# Patient Record
Sex: FEMALE | Race: BLACK OR AFRICAN AMERICAN | NOT HISPANIC OR LATINO | Employment: OTHER | URBAN - METROPOLITAN AREA
[De-identification: names, ages, dates, MRNs, and addresses within clinical notes are randomized per-mention and may not be internally consistent; named-entity substitution may affect disease eponyms.]

---

## 2023-09-15 DIAGNOSIS — D50.0 ANEMIA DUE TO CHRONIC BLOOD LOSS: Primary | ICD-10-CM

## 2023-12-05 ENCOUNTER — OFFICE VISIT (OUTPATIENT)
Dept: HEMATOLOGY/ONCOLOGY | Facility: CLINIC | Age: 63
End: 2023-12-05
Payer: MEDICARE

## 2023-12-05 VITALS
SYSTOLIC BLOOD PRESSURE: 128 MMHG | HEART RATE: 94 BPM | HEIGHT: 64 IN | WEIGHT: 285 LBS | BODY MASS INDEX: 48.65 KG/M2 | TEMPERATURE: 98 F | RESPIRATION RATE: 18 BRPM | OXYGEN SATURATION: 99 % | DIASTOLIC BLOOD PRESSURE: 75 MMHG

## 2023-12-05 DIAGNOSIS — D50.9 IRON DEFICIENCY ANEMIA, UNSPECIFIED IRON DEFICIENCY ANEMIA TYPE: Primary | ICD-10-CM

## 2023-12-05 DIAGNOSIS — D50.0 ANEMIA DUE TO CHRONIC BLOOD LOSS: ICD-10-CM

## 2023-12-05 PROCEDURE — 99204 PR OFFICE/OUTPT VISIT, NEW, LEVL IV, 45-59 MIN: ICD-10-PCS | Mod: ,,, | Performed by: STUDENT IN AN ORGANIZED HEALTH CARE EDUCATION/TRAINING PROGRAM

## 2023-12-05 PROCEDURE — 1159F MED LIST DOCD IN RCRD: CPT | Mod: CPTII,,, | Performed by: STUDENT IN AN ORGANIZED HEALTH CARE EDUCATION/TRAINING PROGRAM

## 2023-12-05 PROCEDURE — 99204 OFFICE O/P NEW MOD 45 MIN: CPT | Mod: ,,, | Performed by: STUDENT IN AN ORGANIZED HEALTH CARE EDUCATION/TRAINING PROGRAM

## 2023-12-05 PROCEDURE — 3074F SYST BP LT 130 MM HG: CPT | Mod: CPTII,,, | Performed by: STUDENT IN AN ORGANIZED HEALTH CARE EDUCATION/TRAINING PROGRAM

## 2023-12-05 PROCEDURE — 3008F PR BODY MASS INDEX (BMI) DOCUMENTED: ICD-10-PCS | Mod: CPTII,,, | Performed by: STUDENT IN AN ORGANIZED HEALTH CARE EDUCATION/TRAINING PROGRAM

## 2023-12-05 PROCEDURE — 3074F PR MOST RECENT SYSTOLIC BLOOD PRESSURE < 130 MM HG: ICD-10-PCS | Mod: CPTII,,, | Performed by: STUDENT IN AN ORGANIZED HEALTH CARE EDUCATION/TRAINING PROGRAM

## 2023-12-05 PROCEDURE — 4010F PR ACE/ARB THEARPY RXD/TAKEN: ICD-10-PCS | Mod: CPTII,,, | Performed by: STUDENT IN AN ORGANIZED HEALTH CARE EDUCATION/TRAINING PROGRAM

## 2023-12-05 PROCEDURE — 4010F ACE/ARB THERAPY RXD/TAKEN: CPT | Mod: CPTII,,, | Performed by: STUDENT IN AN ORGANIZED HEALTH CARE EDUCATION/TRAINING PROGRAM

## 2023-12-05 PROCEDURE — 1159F PR MEDICATION LIST DOCUMENTED IN MEDICAL RECORD: ICD-10-PCS | Mod: CPTII,,, | Performed by: STUDENT IN AN ORGANIZED HEALTH CARE EDUCATION/TRAINING PROGRAM

## 2023-12-05 PROCEDURE — 3078F DIAST BP <80 MM HG: CPT | Mod: CPTII,,, | Performed by: STUDENT IN AN ORGANIZED HEALTH CARE EDUCATION/TRAINING PROGRAM

## 2023-12-05 PROCEDURE — 3078F PR MOST RECENT DIASTOLIC BLOOD PRESSURE < 80 MM HG: ICD-10-PCS | Mod: CPTII,,, | Performed by: STUDENT IN AN ORGANIZED HEALTH CARE EDUCATION/TRAINING PROGRAM

## 2023-12-05 PROCEDURE — 3008F BODY MASS INDEX DOCD: CPT | Mod: CPTII,,, | Performed by: STUDENT IN AN ORGANIZED HEALTH CARE EDUCATION/TRAINING PROGRAM

## 2023-12-05 RX ORDER — HYDROCHLOROTHIAZIDE 25 MG/1
25 TABLET ORAL DAILY
COMMUNITY

## 2023-12-05 RX ORDER — CYCLOBENZAPRINE HCL 10 MG
10 TABLET ORAL 2 TIMES DAILY PRN
COMMUNITY

## 2023-12-05 RX ORDER — FERROUS SULFATE 325(65) MG
325 TABLET, DELAYED RELEASE (ENTERIC COATED) ORAL DAILY
COMMUNITY

## 2023-12-05 RX ORDER — SIMVASTATIN 20 MG/1
20 TABLET, FILM COATED ORAL NIGHTLY
COMMUNITY

## 2023-12-05 RX ORDER — FINASTERIDE 1 MG/1
1 TABLET, FILM COATED ORAL DAILY
COMMUNITY

## 2023-12-05 RX ORDER — LOSARTAN POTASSIUM 50 MG/1
50 TABLET ORAL DAILY
COMMUNITY

## 2023-12-05 RX ORDER — VIT C/E/ZN/COPPR/LUTEIN/ZEAXAN 250MG-90MG
CAPSULE ORAL
COMMUNITY

## 2023-12-05 RX ORDER — NAPROXEN 500 MG/1
500 TABLET ORAL 2 TIMES DAILY
COMMUNITY
Start: 2023-11-25

## 2023-12-05 RX ORDER — PREGABALIN 75 MG/1
75 CAPSULE ORAL 2 TIMES DAILY
COMMUNITY

## 2023-12-05 RX ORDER — OXYCODONE AND ACETAMINOPHEN 10; 325 MG/1; MG/1
1 TABLET ORAL 3 TIMES DAILY
COMMUNITY

## 2023-12-05 NOTE — PROGRESS NOTES
Referring provider:  Brielle Smith NP    Reason for consultation:  Iron-deficiency anemia        HPI:    Patient is a 63-year-old with history of hemorrhoids, hypertension and hyperlipidemia who was found to have iron-deficiency anemia at her PCP's office.  She was treated with oral iron without improvement in his hemoglobin level which led to a referral to our clinic.  Patient presented to our clinic on 12/05/2023 to establish care.    Patient reports symptoms of bright red blood per rectum and attributes to hemorrhoids.  She intermittently has dark stools as well.  Patient reports undergoing colonoscopy 5 years back.  She had symptoms of menorrhagia with led to a hysterectomy many years back with resolution of symptoms since.    She denies any family history of colon cancer.  She lives by herself.  Disabled.  Denies smoking, alcohol or recreational drug use.      Interval history:    Today, 12/05/2023, patient reports intermittent dyspnea on exertion but denies any shortness of breath at rest.  She denies any palpitations, chest tightness, fatigue or dizziness.  She was symptoms of constipation as well as bright red blood per rectum intermittently.    Laboratory     08/30/2023 ferritin 3, iron 16, TIBC 448, % saturation 4, hemoglobin 7.4, MCV 68.8, platelet count 290, WBC count 5.8    Past Medical History:   Diagnosis Date    Anemia, unspecified     Grade II hemorrhoids     Hypertension, essential     Peripheral venous insufficiency     Undiagnosed cardiac murmurs        Past Surgical History:   Procedure Laterality Date    HYSTERECTOMY         Family History   Problem Relation Age of Onset    Diabetes Mother     Hypertension Mother     Heart disease Father        Social History     Socioeconomic History    Marital status: Single   Tobacco Use    Smoking status: Never    Smokeless tobacco: Never   Substance and Sexual Activity    Alcohol use: Never    Drug use: Never       Current Outpatient Medications    Medication Sig Dispense Refill    cholecalciferol, vitamin D3, (VITAMIN D3) 25 mcg (1,000 unit) capsule 1 tablet Orally Once a day      cyclobenzaprine (FLEXERIL) 10 MG tablet Take 10 mg by mouth 2 (two) times daily as needed.      ferrous sulfate 325 (65 FE) MG EC tablet Take 325 mg by mouth once daily.      finasteride (PROPECIA) 1 mg tablet Take 1 mg by mouth once daily.      hydroCHLOROthiazide (HYDRODIURIL) 25 MG tablet Take 25 mg by mouth once daily.      losartan (COZAAR) 50 MG tablet Take 50 mg by mouth once daily.      naproxen (NAPROSYN) 500 MG tablet Take 500 mg by mouth 2 (two) times daily.      oxyCODONE-acetaminophen (PERCOCET)  mg per tablet Take 1 tablet by mouth 3 (three) times daily.      pregabalin (LYRICA) 75 MG capsule Take 75 mg by mouth 2 (two) times daily.      simvastatin (ZOCOR) 20 MG tablet Take 20 mg by mouth every evening.       No current facility-administered medications for this visit.       Review of patient's allergies indicates:  No Known Allergies      Review of systems  CONSTITUTIONAL: no fevers, no chills, no weight loss, no fatigue, no weakness  HEMATOLOGIC: no abnormal bleeding, no abnormal bruising, no drenching night sweats  ONCOLOGIC: no new masses or lumps  HEENT: no vision loss, no tinnitus or hearing loss, no nose bleeding, no dysphagia, no odynophagia  CVS: no chest pain, no palpitations, no dyspnea on exertion  RESP: no shortness of breath, no hemoptysis, no cough  GI: no nausea, no vomiting, no diarrhea, no constipation, no melena, no hematochezia, no hematemesis, no abdominal pain, no increase in abdominal girth  : no dysuria, no hematuria, no hesitancy, no scrotal swelling, no discharge  INTEGUMENT: no rashes, no abnormal bruising, no nail pitting, no hyperpigmentation  NEURO: no falls, no memory loss, no paresthesias or dysesthesias, no urofecal incontinence or retention, no loss of strength on any extremity  MSK: no back pain, no new joint pain, no joint  swelling  PSYCH: no suicidal or homicidal ideation, no depression, no insomnia, no anhedonia  ENDOCRINE: no heat or cold intolerance, no polyuria, no polydipsia      Physical Exam:  Vitals:    12/05/23 1515   BP: 128/75   Pulse: 94   Resp: 18   Temp: 98.3 °F (36.8 °C)       ECOG PS 0  GA: AAOx3, NAD  HEENT:  Mild conjunctival pallor, good dentition, moist oral mucous membranes  LYMPH: no cervical, axillary or supraclavicular adenopathy  CVS: s1s2 RRR, +GORGE  RESP: CTA b/l, no crackles, no wheezes or rhonchi  ABD: soft, NT, ND, BS+, no hepatosplenomegaly  EXT: no deformities, no pedal edema  SKIN: no rashes, warm and dry  NEURO: normal mentation, strength 5/5 on all 4 extremities, no sensory deficits        Assessment    # Iron-deficiency anemia   Noted blood work from patient's PCP's office in August 2023 with microcytic anemia and ferritin 3 consistent with iron-deficiency   Patient was symptoms off bright red blood per rectum which she attributes to hemorrhoids.  Discussed with patient the diagnosis of iron-deficiency anemia as well as his pathophysiology and treatment options   Patient has been on oral iron in the past without improvement in her hemoglobin level   Discussed the options for IV iron, patient was unable this plan of care.        Plan     CBC, CMP, iron panel today  Will plan for IV iron, orders placed for insurance authorization  Gastroenterology referral for EGD/colonoscopy in the setting of iron-deficiency anemia.  Patient denies any history of menorrhagia, status post hysterectomy.    Plan to follow-up in 12 weeks with repeat CBC and iron panel to discuss above workup and to assess treatment response.        A total of  45 minutes were spent in review of records, interpretation of test, coordination of care, discussion and counseling with the patient.        Portions of the record may have been created with voice recognition software. Occasional wrong-word or sound-a-like substitutions may have  occurred due to the inherent limitations of voice recognition software. Read the chart carefully and recognize, using context, where substitutions have occurred.

## 2023-12-14 RX ORDER — EPINEPHRINE 0.3 MG/.3ML
0.3 INJECTION SUBCUTANEOUS ONCE AS NEEDED
Status: CANCELLED | OUTPATIENT
Start: 2023-12-21

## 2023-12-14 RX ORDER — DIPHENHYDRAMINE HYDROCHLORIDE 50 MG/ML
50 INJECTION INTRAMUSCULAR; INTRAVENOUS ONCE AS NEEDED
Status: CANCELLED | OUTPATIENT
Start: 2023-12-21

## 2023-12-14 RX ORDER — SODIUM CHLORIDE 0.9 % (FLUSH) 0.9 %
10 SYRINGE (ML) INJECTION
Status: CANCELLED | OUTPATIENT
Start: 2023-12-14

## 2023-12-14 RX ORDER — SODIUM CHLORIDE 0.9 % (FLUSH) 0.9 %
10 SYRINGE (ML) INJECTION
Status: CANCELLED | OUTPATIENT
Start: 2023-12-21

## 2023-12-14 RX ORDER — DIPHENHYDRAMINE HYDROCHLORIDE 50 MG/ML
50 INJECTION INTRAMUSCULAR; INTRAVENOUS ONCE AS NEEDED
Status: CANCELLED | OUTPATIENT
Start: 2023-12-14

## 2023-12-14 RX ORDER — EPINEPHRINE 0.3 MG/.3ML
0.3 INJECTION SUBCUTANEOUS ONCE AS NEEDED
Status: CANCELLED | OUTPATIENT
Start: 2023-12-14

## 2023-12-14 RX ORDER — HEPARIN 100 UNIT/ML
500 SYRINGE INTRAVENOUS
Status: CANCELLED | OUTPATIENT
Start: 2023-12-14

## 2023-12-14 RX ORDER — HEPARIN 100 UNIT/ML
500 SYRINGE INTRAVENOUS
Status: CANCELLED | OUTPATIENT
Start: 2023-12-21

## 2024-03-22 ENCOUNTER — OFFICE VISIT (OUTPATIENT)
Dept: HEMATOLOGY/ONCOLOGY | Facility: CLINIC | Age: 64
End: 2024-03-22
Payer: MEDICARE

## 2024-03-22 VITALS
WEIGHT: 285.19 LBS | HEIGHT: 64 IN | OXYGEN SATURATION: 98 % | BODY MASS INDEX: 48.69 KG/M2 | DIASTOLIC BLOOD PRESSURE: 83 MMHG | RESPIRATION RATE: 20 BRPM | SYSTOLIC BLOOD PRESSURE: 151 MMHG | HEART RATE: 100 BPM | TEMPERATURE: 98 F

## 2024-03-22 NOTE — PROGRESS NOTES
Referring provider:  Brielle Smith NP    Reason for consultation:  Iron-deficiency anemia        HPI:    Patient is a 63-year-old with history of hemorrhoids, hypertension and hyperlipidemia who was found to have iron-deficiency anemia at her PCP's office.  She was treated with oral iron without improvement in his hemoglobin level which led to a referral to our clinic.  Patient presented to our clinic on 12/05/2023 to establish care.    Patient reports symptoms of bright red blood per rectum and attributes to hemorrhoids.  She intermittently has dark stools as well.  Patient reports undergoing colonoscopy 5 years back.  She had symptoms of menorrhagia with led to a hysterectomy many years back with resolution of symptoms since.    She denies any family history of colon cancer.  She lives by herself.  Disabled.  Denies smoking, alcohol or recreational drug use.      Interval history:    Today, 03/22/24, patient returns for follow-up.  She reports some improvement in sob since iron infusion. She denies any palpitations, chest tightness, fatigue or dizziness.  She continues with constipation but denies bright red blood per rectum.    Laboratory     03/11/24: ferritin 42, iron 87, ironsat 27%, CMP WNL, wbc 6.25, hgb 10.5, plt 252, ANC 3.69  08/30/2023 ferritin 3, iron 16, TIBC 448, % saturation 4, hemoglobin 7.4, MCV 68.8, platelet count 290, WBC count 5.8    Past Medical History:   Diagnosis Date    Anemia, unspecified     Grade II hemorrhoids     Hypertension, essential     Peripheral venous insufficiency     Undiagnosed cardiac murmurs        Past Surgical History:   Procedure Laterality Date    HYSTERECTOMY         Family History   Problem Relation Age of Onset    Diabetes Mother     Hypertension Mother     Heart disease Father        Social History     Socioeconomic History    Marital status: Single   Tobacco Use    Smoking status: Never    Smokeless tobacco: Never   Substance and Sexual Activity    Alcohol use:  Never    Drug use: Never       Current Outpatient Medications   Medication Sig Dispense Refill    cholecalciferol, vitamin D3, (VITAMIN D3) 25 mcg (1,000 unit) capsule 1 tablet Orally Once a day      cyclobenzaprine (FLEXERIL) 10 MG tablet Take 10 mg by mouth 2 (two) times daily as needed.      ferrous sulfate 325 (65 FE) MG EC tablet Take 325 mg by mouth once daily.      finasteride (PROPECIA) 1 mg tablet Take 1 mg by mouth once daily.      hydroCHLOROthiazide (HYDRODIURIL) 25 MG tablet Take 25 mg by mouth once daily.      losartan (COZAAR) 50 MG tablet Take 50 mg by mouth once daily.      pregabalin (LYRICA) 75 MG capsule Take 75 mg by mouth 2 (two) times daily.      simvastatin (ZOCOR) 20 MG tablet Take 20 mg by mouth every evening.      naproxen (NAPROSYN) 500 MG tablet Take 500 mg by mouth 2 (two) times daily.      oxyCODONE-acetaminophen (PERCOCET)  mg per tablet Take 1 tablet by mouth 3 (three) times daily.       No current facility-administered medications for this visit.       Review of patient's allergies indicates:  No Known Allergies      Review of systems  CONSTITUTIONAL: no fevers, no chills, no weight loss, no fatigue, no weakness  HEMATOLOGIC: no abnormal bleeding, no abnormal bruising, no drenching night sweats  ONCOLOGIC: no new masses or lumps  HEENT: no vision loss, no tinnitus or hearing loss, no nose bleeding, no dysphagia, no odynophagia  CVS: no chest pain, no palpitations, no dyspnea on exertion  RESP: no shortness of breath, no hemoptysis, no cough  GI: no nausea, no vomiting, no diarrhea, no constipation, no melena, no hematochezia, no hematemesis, no abdominal pain, no increase in abdominal girth  : no dysuria, no hematuria, no hesitancy, no scrotal swelling, no discharge  INTEGUMENT: no rashes, no abnormal bruising, no nail pitting, no hyperpigmentation  NEURO: no falls, no memory loss, no paresthesias or dysesthesias, no urofecal incontinence or retention, no loss of strength on  "any extremity  MSK: no back pain, no new joint pain, no joint swelling  PSYCH: no suicidal or homicidal ideation, no depression, no insomnia, no anhedonia  ENDOCRINE: no heat or cold intolerance, no polyuria, no polydipsia      Physical Exam:  Blood pressure (!) 151/83, pulse 100, temperature 98.2 °F (36.8 °C), temperature source Oral, resp. rate 20, height 5' 4" (1.626 m), weight 129.4 kg (285 lb 3.2 oz), SpO2 98 %.    ECOG PS 0  GA: AAOx3, NAD  HEENT:  Mild conjunctival pallor, good dentition, moist oral mucous membranes  LYMPH: no cervical, axillary or supraclavicular adenopathy  CVS: s1s2 RRR, +GORGE  RESP: CTA b/l, no crackles, no wheezes or rhonchi  ABD: soft, NT, ND, BS+, no hepatosplenomegaly  EXT: no deformities, no pedal edema  SKIN: no rashes, warm and dry  NEURO: normal mentation, strength 5/5 on all 4 extremities, no sensory deficits        Assessment    # Iron-deficiency anemia   Noted blood work from patient's PCP's office in August 2023 with microcytic anemia and ferritin 3 consistent with iron-deficiency   Patient was symptoms off bright red blood per rectum which she attributes to hemorrhoids.  Discussed with patient the diagnosis of iron-deficiency anemia as well as his pathophysiology and treatment options   Patient has been on oral iron in the past without improvement in her hemoglobin level   Discussed the options for IV iron, patient was unable this plan of care.        Plan   Labs reviewed, noted improved iron levels since IV iron in 12/2023  Continue with oral iron every 2 days via PCP  Follow-up with GI for hemorrhoid banding  Plan to follow-up in 4 months with repeat CBC and iron panel to discuss above workup and to assess treatment response.        A total of  30 minutes were spent in review of records, interpretation of test, coordination of care, discussion and counseling with the patient.          "

## 2024-07-25 NOTE — PROGRESS NOTES
Referring provider:  Brielle Smith NP    Reason for consultation:  Iron-deficiency anemia    HPI:    Patient is a 63-year-old with history of hemorrhoids, hypertension and hyperlipidemia who was found to have iron-deficiency anemia at her PCP's office.  She was treated with oral iron without improvement in his hemoglobin level which led to a referral to our clinic.  Patient presented to our clinic on 12/05/2023 to establish care.    Patient reports symptoms of bright red blood per rectum and attributes to hemorrhoids.  She intermittently has dark stools as well.  Patient reports undergoing colonoscopy 5 years back.  She had symptoms of menorrhagia with led to a hysterectomy many years back with resolution of symptoms since.    She denies any family history of colon cancer.  She lives by herself.  Disabled.  Denies smoking, alcohol or recreational drug use.      Interval history:    Today, 7/29/24, patient returns for follow-up. Patient continues with blood loss per rectum and symptomatic anemia. Will order IV iron today. She denies any palpitations, chest tightness, or dizziness but is experiencing fatigue. She sees Dr. Gamino for chronic constipation and hemorrhoids. Encouraged her to follow up with him asap and try to find the source of continued blood loss.    Laboratory   07/22/24: ferritin 15, iron 75, ironsat 18%, CMP WNL, wbc 6.53, hgb 8.4, plt 273, ANC 3.56  03/11/24: ferritin 42, iron 87, ironsat 27%, CMP WNL, wbc 6.25, hgb 10.5, plt 252, ANC 3.69  08/30/2023 ferritin 3, iron 16, TIBC 448, % saturation 4, hemoglobin 7.4, MCV 68.8, platelet count 290, WBC count 5.8    Past Medical History:   Diagnosis Date    Anemia, unspecified     Grade II hemorrhoids     Hypertension, essential     Peripheral venous insufficiency     Undiagnosed cardiac murmurs        Past Surgical History:   Procedure Laterality Date    HYSTERECTOMY         Family History   Problem Relation Name Age of Onset    Diabetes Mother       Hypertension Mother      Heart disease Father         Social History     Socioeconomic History    Marital status: Single   Tobacco Use    Smoking status: Never    Smokeless tobacco: Never   Substance and Sexual Activity    Alcohol use: Never    Drug use: Never       Current Outpatient Medications   Medication Sig Dispense Refill    cholecalciferol, vitamin D3, (VITAMIN D3) 25 mcg (1,000 unit) capsule 1 tablet Orally Once a day      cyclobenzaprine (FLEXERIL) 10 MG tablet Take 10 mg by mouth 2 (two) times daily as needed.      ferrous sulfate 325 (65 FE) MG EC tablet Take 325 mg by mouth once daily.      finasteride (PROPECIA) 1 mg tablet Take 1 mg by mouth once daily.      hydroCHLOROthiazide (HYDRODIURIL) 25 MG tablet Take 25 mg by mouth once daily.      losartan (COZAAR) 50 MG tablet Take 50 mg by mouth once daily.      naproxen (NAPROSYN) 500 MG tablet Take 500 mg by mouth 2 (two) times daily.      oxyCODONE-acetaminophen (PERCOCET)  mg per tablet Take 1 tablet by mouth 3 (three) times daily.      pregabalin (LYRICA) 75 MG capsule Take 75 mg by mouth 2 (two) times daily.      simvastatin (ZOCOR) 20 MG tablet Take 20 mg by mouth every evening.       No current facility-administered medications for this visit.       Review of patient's allergies indicates:  No Known Allergies      Review of systems  CONSTITUTIONAL: no fevers, no chills, no weight loss, no fatigue, no weakness  HEMATOLOGIC: no abnormal bleeding, no abnormal bruising, no drenching night sweats  ONCOLOGIC: no new masses or lumps  HEENT: no vision loss, no tinnitus or hearing loss, no nose bleeding, no dysphagia, no odynophagia  CVS: no chest pain, no palpitations, no dyspnea on exertion  RESP: no shortness of breath, no hemoptysis, no cough  GI: no nausea, no vomiting, no diarrhea, no constipation, no melena, no hematochezia, no hematemesis, no abdominal pain, no increase in abdominal girth  : no dysuria, no hematuria, no hesitancy, no scrotal  swelling, no discharge  INTEGUMENT: no rashes, no abnormal bruising, no nail pitting, no hyperpigmentation  NEURO: no falls, no memory loss, no paresthesias or dysesthesias, no urofecal incontinence or retention, no loss of strength on any extremity  MSK: no back pain, no new joint pain, no joint swelling  PSYCH: no suicidal or homicidal ideation, no depression, no insomnia, no anhedonia  ENDOCRINE: no heat or cold intolerance, no polyuria, no polydipsia      Physical Exam:  There were no vitals taken for this visit.    ECOG PS 0  GA: AAOx3, NAD  HEENT:  Mild conjunctival pallor, good dentition, moist oral mucous membranes  LYMPH: no cervical, axillary or supraclavicular adenopathy  CVS: s1s2 RRR, +GORGE  RESP: CTA b/l, no crackles, no wheezes or rhonchi  ABD: soft, NT, ND, BS+, no hepatosplenomegaly  EXT: no deformities, no pedal edema  SKIN: no rashes, warm and dry  NEURO: normal mentation, strength 5/5 on all 4 extremities, no sensory deficits    Assessment    # Iron-deficiency anemia   Noted blood work from patient's PCP's office in August 2023 with microcytic anemia and ferritin 3 consistent with iron-deficiency   Patient was symptoms off bright red blood per rectum which she attributes to hemorrhoids.  Discussed with patient the diagnosis of iron-deficiency anemia as well as his pathophysiology and treatment options   Patient has been on oral iron in the past without improvement in her hemoglobin level   Discussed the options for IV iron, patient was unable this plan of care.    Plan   Labs reviewed, noted symptomatic anemia. Will order IV iron today  Continue with oral iron every 2 days via PCP (patient cannot tolerate more often due to chronic constipation)  Follow-up with GI asap to address continued blood loss  Plan to follow-up in 3 months with repeat CBC and iron panel to discuss above workup and to assess treatment response.      Renate GREENFIELD-C

## 2024-07-29 ENCOUNTER — OFFICE VISIT (OUTPATIENT)
Dept: HEMATOLOGY/ONCOLOGY | Facility: CLINIC | Age: 64
End: 2024-07-29
Payer: MEDICARE

## 2024-07-29 VITALS
OXYGEN SATURATION: 98 % | WEIGHT: 293 LBS | TEMPERATURE: 98 F | SYSTOLIC BLOOD PRESSURE: 159 MMHG | DIASTOLIC BLOOD PRESSURE: 84 MMHG | RESPIRATION RATE: 18 BRPM | BODY MASS INDEX: 50.02 KG/M2 | HEIGHT: 64 IN | HEART RATE: 96 BPM

## 2024-07-29 DIAGNOSIS — D50.0 IRON DEFICIENCY ANEMIA DUE TO CHRONIC BLOOD LOSS: Primary | ICD-10-CM

## 2024-07-29 PROCEDURE — 4010F ACE/ARB THERAPY RXD/TAKEN: CPT | Mod: CPTII,,,

## 2024-07-29 PROCEDURE — 3079F DIAST BP 80-89 MM HG: CPT | Mod: CPTII,,,

## 2024-07-29 PROCEDURE — 3008F BODY MASS INDEX DOCD: CPT | Mod: CPTII,,,

## 2024-07-29 PROCEDURE — 99214 OFFICE O/P EST MOD 30 MIN: CPT | Mod: ,,,

## 2024-07-29 PROCEDURE — 1159F MED LIST DOCD IN RCRD: CPT | Mod: CPTII,,,

## 2024-07-29 PROCEDURE — 3077F SYST BP >= 140 MM HG: CPT | Mod: CPTII,,,

## 2024-07-29 RX ORDER — DIPHENHYDRAMINE HYDROCHLORIDE 50 MG/ML
50 INJECTION INTRAMUSCULAR; INTRAVENOUS ONCE AS NEEDED
OUTPATIENT
Start: 2024-07-29

## 2024-07-29 RX ORDER — HEPARIN 100 UNIT/ML
500 SYRINGE INTRAVENOUS
OUTPATIENT
Start: 2024-08-05

## 2024-07-29 RX ORDER — EPINEPHRINE 0.3 MG/.3ML
0.3 INJECTION SUBCUTANEOUS ONCE AS NEEDED
OUTPATIENT
Start: 2024-08-05

## 2024-07-29 RX ORDER — SODIUM CHLORIDE 0.9 % (FLUSH) 0.9 %
10 SYRINGE (ML) INJECTION
OUTPATIENT
Start: 2024-07-29

## 2024-07-29 RX ORDER — HEPARIN 100 UNIT/ML
500 SYRINGE INTRAVENOUS
OUTPATIENT
Start: 2024-07-29

## 2024-07-29 RX ORDER — EPINEPHRINE 0.3 MG/.3ML
0.3 INJECTION SUBCUTANEOUS ONCE AS NEEDED
OUTPATIENT
Start: 2024-07-29

## 2024-07-29 RX ORDER — DIPHENHYDRAMINE HYDROCHLORIDE 50 MG/ML
50 INJECTION INTRAMUSCULAR; INTRAVENOUS ONCE AS NEEDED
OUTPATIENT
Start: 2024-08-05

## 2024-07-29 RX ORDER — SODIUM CHLORIDE 0.9 % (FLUSH) 0.9 %
10 SYRINGE (ML) INJECTION
OUTPATIENT
Start: 2024-08-05

## 2024-10-29 ENCOUNTER — OFFICE VISIT (OUTPATIENT)
Dept: HEMATOLOGY/ONCOLOGY | Facility: CLINIC | Age: 64
End: 2024-10-29
Payer: MEDICARE

## 2024-10-29 VITALS
HEART RATE: 102 BPM | SYSTOLIC BLOOD PRESSURE: 169 MMHG | RESPIRATION RATE: 18 BRPM | WEIGHT: 293 LBS | TEMPERATURE: 98 F | OXYGEN SATURATION: 98 % | HEIGHT: 64 IN | BODY MASS INDEX: 50.02 KG/M2 | DIASTOLIC BLOOD PRESSURE: 84 MMHG

## 2024-10-29 DIAGNOSIS — D50.0 IRON DEFICIENCY ANEMIA DUE TO CHRONIC BLOOD LOSS: Primary | ICD-10-CM

## 2024-10-29 PROCEDURE — 4010F ACE/ARB THERAPY RXD/TAKEN: CPT | Mod: CPTII,,,

## 2024-10-29 PROCEDURE — 99214 OFFICE O/P EST MOD 30 MIN: CPT | Mod: ,,,

## 2024-10-29 PROCEDURE — 3079F DIAST BP 80-89 MM HG: CPT | Mod: CPTII,,,

## 2024-10-29 PROCEDURE — 1160F RVW MEDS BY RX/DR IN RCRD: CPT | Mod: CPTII,,,

## 2024-10-29 PROCEDURE — 1159F MED LIST DOCD IN RCRD: CPT | Mod: CPTII,,,

## 2024-10-29 PROCEDURE — 3008F BODY MASS INDEX DOCD: CPT | Mod: CPTII,,,

## 2024-10-29 PROCEDURE — 3077F SYST BP >= 140 MM HG: CPT | Mod: CPTII,,,

## 2024-10-29 RX ORDER — HEPARIN 100 UNIT/ML
500 SYRINGE INTRAVENOUS
OUTPATIENT
Start: 2024-10-30

## 2024-10-29 RX ORDER — SODIUM CHLORIDE 0.9 % (FLUSH) 0.9 %
10 SYRINGE (ML) INJECTION
OUTPATIENT
Start: 2024-10-30

## 2024-11-12 RX ORDER — HEPARIN 100 UNIT/ML
500 SYRINGE INTRAVENOUS
Status: CANCELLED | OUTPATIENT
Start: 2024-11-12

## 2024-11-12 RX ORDER — SODIUM CHLORIDE 0.9 % (FLUSH) 0.9 %
10 SYRINGE (ML) INJECTION
Status: CANCELLED | OUTPATIENT
Start: 2024-11-12

## 2024-11-19 RX ORDER — HEPARIN 100 UNIT/ML
500 SYRINGE INTRAVENOUS
Status: CANCELLED | OUTPATIENT
Start: 2024-11-19

## 2024-11-19 RX ORDER — SODIUM CHLORIDE 0.9 % (FLUSH) 0.9 %
10 SYRINGE (ML) INJECTION
Status: CANCELLED | OUTPATIENT
Start: 2024-11-19

## 2024-12-02 RX ORDER — HEPARIN 100 UNIT/ML
500 SYRINGE INTRAVENOUS
Status: CANCELLED | OUTPATIENT
Start: 2024-12-03

## 2024-12-02 RX ORDER — SODIUM CHLORIDE 0.9 % (FLUSH) 0.9 %
10 SYRINGE (ML) INJECTION
Status: CANCELLED | OUTPATIENT
Start: 2024-12-03

## 2024-12-10 RX ORDER — SODIUM CHLORIDE 0.9 % (FLUSH) 0.9 %
10 SYRINGE (ML) INJECTION
Status: CANCELLED | OUTPATIENT
Start: 2024-12-10

## 2024-12-10 RX ORDER — HEPARIN 100 UNIT/ML
500 SYRINGE INTRAVENOUS
Status: CANCELLED | OUTPATIENT
Start: 2024-12-10

## 2024-12-16 RX ORDER — SODIUM CHLORIDE 0.9 % (FLUSH) 0.9 %
10 SYRINGE (ML) INJECTION
Status: CANCELLED | OUTPATIENT
Start: 2024-12-17

## 2024-12-16 RX ORDER — HEPARIN 100 UNIT/ML
500 SYRINGE INTRAVENOUS
Status: CANCELLED | OUTPATIENT
Start: 2024-12-17

## 2024-12-20 RX ORDER — SODIUM CHLORIDE 0.9 % (FLUSH) 0.9 %
10 SYRINGE (ML) INJECTION
OUTPATIENT
Start: 2024-12-24

## 2024-12-20 RX ORDER — HEPARIN 100 UNIT/ML
500 SYRINGE INTRAVENOUS
OUTPATIENT
Start: 2024-12-24

## 2025-01-07 RX ORDER — HEPARIN 100 UNIT/ML
500 SYRINGE INTRAVENOUS
Status: CANCELLED | OUTPATIENT
Start: 2025-01-07

## 2025-01-07 RX ORDER — SODIUM CHLORIDE 0.9 % (FLUSH) 0.9 %
10 SYRINGE (ML) INJECTION
Status: CANCELLED | OUTPATIENT
Start: 2025-01-07

## 2025-02-04 ENCOUNTER — OFFICE VISIT (OUTPATIENT)
Dept: HEMATOLOGY/ONCOLOGY | Facility: CLINIC | Age: 65
End: 2025-02-04
Payer: MEDICARE

## 2025-02-04 VITALS
WEIGHT: 293 LBS | HEIGHT: 64 IN | TEMPERATURE: 98 F | OXYGEN SATURATION: 96 % | BODY MASS INDEX: 50.02 KG/M2 | SYSTOLIC BLOOD PRESSURE: 146 MMHG | DIASTOLIC BLOOD PRESSURE: 81 MMHG | RESPIRATION RATE: 18 BRPM | HEART RATE: 103 BPM

## 2025-02-04 DIAGNOSIS — D50.0 IRON DEFICIENCY ANEMIA DUE TO CHRONIC BLOOD LOSS: Primary | ICD-10-CM

## 2025-02-04 PROCEDURE — 3077F SYST BP >= 140 MM HG: CPT | Mod: CPTII,,,

## 2025-02-04 PROCEDURE — 99214 OFFICE O/P EST MOD 30 MIN: CPT | Mod: ,,,

## 2025-02-04 PROCEDURE — 3008F BODY MASS INDEX DOCD: CPT | Mod: CPTII,,,

## 2025-02-04 PROCEDURE — 3079F DIAST BP 80-89 MM HG: CPT | Mod: CPTII,,,

## 2025-02-04 NOTE — PROGRESS NOTES
Referring provider:  Brielle Smith NP    Reason for consultation:  Iron-deficiency anemia    HPI:    Patient is a 63-year-old with history of hemorrhoids, hypertension and hyperlipidemia who was found to have iron-deficiency anemia at her PCP's office.  She was treated with oral iron without improvement in his hemoglobin level which led to a referral to our clinic.  Patient presented to our clinic on 12/05/2023 to establish care.    Patient reports symptoms of bright red blood per rectum and attributes to hemorrhoids.  She intermittently has dark stools as well.  Patient reports undergoing colonoscopy 5 years back.  She had symptoms of menorrhagia with led to a hysterectomy many years back with resolution of symptoms since.    She denies any family history of colon cancer.  She lives by herself.  Disabled.  Denies smoking, alcohol or recreational drug use.      Interval history:    Today, 02/04/25, patient returns for follow-up. Patient had follow-up last with Dr. Gamino recommends colonoscopy. Patient has not scheduled one as of today. She is compliant with oral iron. She denies any palpitations, chest tightness, or dizziness but is experiencing fatigue.     Laboratory   02/03/25: CMP unremarkable, iron 83, ironsat 26%, ferritin 52, folic acid 15.86, B12 1201, wbc 6.12, hgb 10.2, plt 235, ANC 3.53  10/25/24: iron 17, iron sat 4%, CMP WNL, wbc 6.3, hgb 7.6, plt 293, ANC 3.4    03/11/24: ferritin 42, iron 87, ironsat 27%, CMP WNL, wbc 6.25, hgb 10.5, plt 252, ANC 3.69    08/30/2023 ferritin 3, iron 16, TIBC 448, % saturation 4, hemoglobin 7.4, MCV 68.8, platelet count 290, WBC count 5.8    Past Medical History:   Diagnosis Date    Anemia, unspecified     Grade II hemorrhoids     Hypertension, essential     Peripheral venous insufficiency     Undiagnosed cardiac murmurs        Past Surgical History:   Procedure Laterality Date    HYSTERECTOMY         Family History   Problem Relation Name Age of Onset    Diabetes  Mother      Hypertension Mother      Heart disease Father         Social History     Socioeconomic History    Marital status: Single   Tobacco Use    Smoking status: Never    Smokeless tobacco: Never   Substance and Sexual Activity    Alcohol use: Never    Drug use: Never       Current Outpatient Medications   Medication Sig Dispense Refill    cholecalciferol, vitamin D3, (VITAMIN D3) 25 mcg (1,000 unit) capsule 1 tablet Orally Once a day      cyclobenzaprine (FLEXERIL) 10 MG tablet Take 10 mg by mouth 2 (two) times daily as needed.      ferrous sulfate 325 (65 FE) MG EC tablet Take 325 mg by mouth once daily.      finasteride (PROPECIA) 1 mg tablet Take 1 mg by mouth once daily.      hydroCHLOROthiazide (HYDRODIURIL) 25 MG tablet Take 25 mg by mouth once daily.      losartan (COZAAR) 50 MG tablet Take 50 mg by mouth once daily.      naproxen (NAPROSYN) 500 MG tablet Take 500 mg by mouth 2 (two) times daily.      oxyCODONE-acetaminophen (PERCOCET)  mg per tablet Take 1 tablet by mouth 3 (three) times daily.      pregabalin (LYRICA) 75 MG capsule Take 75 mg by mouth 2 (two) times daily.      simvastatin (ZOCOR) 20 MG tablet Take 20 mg by mouth every evening.       No current facility-administered medications for this visit.       Review of patient's allergies indicates:  No Known Allergies      Review of systems  CONSTITUTIONAL: no fevers, no chills, no weight loss, no fatigue, no weakness  HEMATOLOGIC: no abnormal bleeding, no abnormal bruising, no drenching night sweats  ONCOLOGIC: no new masses or lumps  HEENT: no vision loss, no tinnitus or hearing loss, no nose bleeding, no dysphagia, no odynophagia  CVS: no chest pain, no palpitations, no dyspnea on exertion  RESP: no shortness of breath, no hemoptysis, no cough  GI: no nausea, no vomiting, no diarrhea, no constipation, no melena, no hematochezia, no hematemesis, no abdominal pain, no increase in abdominal girth  : no dysuria, no hematuria, no  "hesitancy, no scrotal swelling, no discharge  INTEGUMENT: no rashes, no abnormal bruising, no nail pitting, no hyperpigmentation  NEURO: no falls, no memory loss, no paresthesias or dysesthesias, no urofecal incontinence or retention, no loss of strength on any extremity  MSK: no back pain, no new joint pain, no joint swelling  PSYCH: no suicidal or homicidal ideation, no depression, no insomnia, no anhedonia  ENDOCRINE: no heat or cold intolerance, no polyuria, no polydipsia      Physical Exam:  Blood pressure (!) 146/81, pulse 103, temperature 98.2 °F (36.8 °C), temperature source Oral, resp. rate 18, height 5' 4" (1.626 m), weight (!) 138.6 kg (305 lb 8 oz), SpO2 96%.    ECOG PS 0  GA: AAOx3, NAD  HEENT:  Mild conjunctival pallor, good dentition, moist oral mucous membranes  LYMPH: no cervical, axillary or supraclavicular adenopathy  CVS: s1s2 RRR, +GORGE  RESP: CTA b/l, no crackles, no wheezes or rhonchi  ABD: soft, NT, ND, BS+, no hepatosplenomegaly  EXT: no deformities, no pedal edema  SKIN: no rashes, warm and dry  NEURO: normal mentation, strength 5/5 on all 4 extremities, no sensory deficits    Assessment    # Iron-deficiency anemia   Noted blood work from patient's PCP's office in August 2023 with microcytic anemia and ferritin 3 consistent with iron-deficiency   Patient was symptoms off bright red blood per rectum which she attributes to hemorrhoids.  Discussed with patient the diagnosis of iron-deficiency anemia as well as his pathophysiology and treatment options   Patient has been on oral iron in the past without improvement in her hemoglobin level   Discussed the options for IV iron, patient was unable this plan of care.    Plan   Labs reviewed, noted symptomatic anemia.   Continue with oral iron every 2 days via PCP (patient cannot tolerate more often due to chronic constipation)  Follow-up with GI and colonoscopy ASAP as recommended by Dr. Gamino.   Plan to follow-up in 3 months with repeat CBC and " iron panel to discuss above workup and to assess treatment response.

## 2025-05-06 ENCOUNTER — OFFICE VISIT (OUTPATIENT)
Dept: HEMATOLOGY/ONCOLOGY | Facility: CLINIC | Age: 65
End: 2025-05-06
Payer: MEDICARE

## 2025-05-06 VITALS
TEMPERATURE: 98 F | RESPIRATION RATE: 16 BRPM | HEIGHT: 64 IN | BODY MASS INDEX: 50.02 KG/M2 | HEART RATE: 118 BPM | DIASTOLIC BLOOD PRESSURE: 75 MMHG | WEIGHT: 293 LBS | SYSTOLIC BLOOD PRESSURE: 147 MMHG | OXYGEN SATURATION: 96 %

## 2025-05-06 DIAGNOSIS — D50.0 IRON DEFICIENCY ANEMIA DUE TO CHRONIC BLOOD LOSS: Primary | ICD-10-CM

## 2025-05-06 PROCEDURE — 99214 OFFICE O/P EST MOD 30 MIN: CPT | Mod: ,,,

## 2025-05-06 PROCEDURE — 3008F BODY MASS INDEX DOCD: CPT | Mod: CPTII,,,

## 2025-05-06 PROCEDURE — 4010F ACE/ARB THERAPY RXD/TAKEN: CPT | Mod: CPTII,,,

## 2025-05-06 PROCEDURE — 3078F DIAST BP <80 MM HG: CPT | Mod: CPTII,,,

## 2025-05-06 PROCEDURE — 3077F SYST BP >= 140 MM HG: CPT | Mod: CPTII,,,

## 2025-05-06 RX ORDER — METOPROLOL TARTRATE 25 MG/1
25 TABLET, FILM COATED ORAL NIGHTLY
COMMUNITY
Start: 2025-05-02

## 2025-05-06 NOTE — PROGRESS NOTES
Referring provider:  Brielle Smith NP    Reason for consultation:  Iron-deficiency anemia    HPI:    Patient is a 63-year-old with history of hemorrhoids, hypertension and hyperlipidemia who was found to have iron-deficiency anemia at her PCP's office.  She was treated with oral iron without improvement in his hemoglobin level which led to a referral to our clinic.  Patient presented to our clinic on 12/05/2023 to establish care.    Patient reports symptoms of bright red blood per rectum and attributes to hemorrhoids.  She intermittently has dark stools as well.  Patient reports undergoing colonoscopy 5 years back.  She had symptoms of menorrhagia with led to a hysterectomy many years back with resolution of symptoms since.    She denies any family history of colon cancer.  She lives by herself.  Disabled.  Denies smoking, alcohol or recreational drug use.      Interval history:    Today, 05/06/25, patient returns for follow-up. Patient had colonoscopy 2/18/25 and hemorrhoid banding last week with Dr. Gamino.  She is compliant with oral iron. She denies any palpitations, chest tightness, or dizziness but is experiencing fatigue.     Laboratory   05/05/25: CMP WNL, iron 105, ironsat 27%, ferritin 15, folic acid 17.84, B12 912, wbc 6.60, hgb 10.2, plt 260, ANC 3.82  02/03/25: CMP unremarkable, iron 83, ironsat 26%, ferritin 52, folic acid 15.86, B12 1201, wbc 6.12, hgb 10.2, plt 235, ANC 3.53  10/25/24: iron 17, iron sat 4%, CMP WNL, wbc 6.3, hgb 7.6, plt 293, ANC 3.4    03/11/24: ferritin 42, iron 87, ironsat 27%, CMP WNL, wbc 6.25, hgb 10.5, plt 252, ANC 3.69    08/30/2023 ferritin 3, iron 16, TIBC 448, % saturation 4, hemoglobin 7.4, MCV 68.8, platelet count 290, WBC count 5.8    Past Medical History:   Diagnosis Date    Anemia, unspecified     Grade II hemorrhoids     Hypertension, essential     Peripheral venous insufficiency     Undiagnosed cardiac murmurs        Past Surgical History:   Procedure Laterality  Date    HYSTERECTOMY         Family History   Problem Relation Name Age of Onset    Diabetes Mother      Hypertension Mother      Heart disease Father         Social History     Socioeconomic History    Marital status: Single   Tobacco Use    Smoking status: Never    Smokeless tobacco: Never   Substance and Sexual Activity    Alcohol use: Never    Drug use: Never       Current Outpatient Medications   Medication Sig Dispense Refill    cholecalciferol, vitamin D3, (VITAMIN D3) 25 mcg (1,000 unit) capsule 1 tablet Orally Once a day      cyclobenzaprine (FLEXERIL) 10 MG tablet Take 10 mg by mouth 2 (two) times daily as needed.      ferrous sulfate 325 (65 FE) MG EC tablet Take 325 mg by mouth once daily.      finasteride (PROPECIA) 1 mg tablet Take 1 mg by mouth once daily.      hydroCHLOROthiazide (HYDRODIURIL) 25 MG tablet Take 25 mg by mouth once daily.      losartan (COZAAR) 50 MG tablet Take 50 mg by mouth once daily.      naproxen (NAPROSYN) 500 MG tablet Take 500 mg by mouth 2 (two) times daily.      oxyCODONE-acetaminophen (PERCOCET)  mg per tablet Take 1 tablet by mouth 3 (three) times daily.      pregabalin (LYRICA) 75 MG capsule Take 75 mg by mouth 2 (two) times daily.      simvastatin (ZOCOR) 20 MG tablet Take 20 mg by mouth every evening.       No current facility-administered medications for this visit.       Review of patient's allergies indicates:  No Known Allergies      Review of systems  CONSTITUTIONAL: no fevers, no chills, no weight loss, no fatigue, no weakness  HEMATOLOGIC: no abnormal bleeding, no abnormal bruising, no drenching night sweats  ONCOLOGIC: no new masses or lumps  HEENT: no vision loss, no tinnitus or hearing loss, no nose bleeding, no dysphagia, no odynophagia  CVS: no chest pain, no palpitations, no dyspnea on exertion  RESP: no shortness of breath, no hemoptysis, no cough  GI: no nausea, no vomiting, no diarrhea, no constipation, no melena, no hematochezia, no hematemesis,  "no abdominal pain, no increase in abdominal girth  : no dysuria, no hematuria, no hesitancy, no scrotal swelling, no discharge  INTEGUMENT: no rashes, no abnormal bruising, no nail pitting, no hyperpigmentation  NEURO: no falls, no memory loss, no paresthesias or dysesthesias, no urofecal incontinence or retention, no loss of strength on any extremity  MSK: no back pain, no new joint pain, no joint swelling  PSYCH: no suicidal or homicidal ideation, no depression, no insomnia, no anhedonia  ENDOCRINE: no heat or cold intolerance, no polyuria, no polydipsia      Physical Exam:  Blood pressure (!) 181/69, pulse (!) 118, temperature 98.2 °F (36.8 °C), temperature source Oral, resp. rate 16, height 5' 4" (1.626 m), weight (!) 139.8 kg (308 lb 1.6 oz), SpO2 96%.    ECOG PS 0  GA: AAOx3, NAD  HEENT:  Mild conjunctival pallor, good dentition, moist oral mucous membranes  LYMPH: no cervical, axillary or supraclavicular adenopathy  CVS: s1s2 RRR, +GORGE  RESP: CTA b/l, no crackles, no wheezes or rhonchi  ABD: soft, NT, ND, BS+, no hepatosplenomegaly  EXT: no deformities, no pedal edema  SKIN: no rashes, warm and dry  NEURO: normal mentation, strength 5/5 on all 4 extremities, no sensory deficits    Assessment    # Iron-deficiency anemia   Noted blood work from patient's PCP's office in August 2023 with microcytic anemia and ferritin 3 consistent with iron-deficiency   Patient was symptoms off bright red blood per rectum which she attributes to hemorrhoids.  Discussed with patient the diagnosis of iron-deficiency anemia as well as his pathophysiology and treatment options   Patient has been on oral iron in the past without improvement in her hemoglobin level   Discussed the options for IV iron, patient was unable this plan of care.    Plan   Labs reviewed, noted symptomatic anemia.   Continue with oral iron every 2 days via PCP (patient cannot tolerate more often due to chronic constipation)  Follow-up with GI as " recommended  Plan to follow-up in 4 months with repeat CBC and iron panel to discuss above workup and to assess treatment response.